# Patient Record
Sex: FEMALE | Race: WHITE
[De-identification: names, ages, dates, MRNs, and addresses within clinical notes are randomized per-mention and may not be internally consistent; named-entity substitution may affect disease eponyms.]

---

## 2017-04-02 ENCOUNTER — HOSPITAL ENCOUNTER (EMERGENCY)
Dept: HOSPITAL 8 - ED | Age: 42
Discharge: HOME | End: 2017-04-02
Payer: MEDICAID

## 2017-04-02 VITALS — HEIGHT: 68 IN | BODY MASS INDEX: 17.81 KG/M2 | WEIGHT: 117.51 LBS

## 2017-04-02 VITALS — SYSTOLIC BLOOD PRESSURE: 124 MMHG | DIASTOLIC BLOOD PRESSURE: 67 MMHG

## 2017-04-02 DIAGNOSIS — N83.00: ICD-10-CM

## 2017-04-02 DIAGNOSIS — D25.9: Primary | ICD-10-CM

## 2017-04-02 DIAGNOSIS — Z88.8: ICD-10-CM

## 2017-04-02 LAB
ANISOCYTOSIS BLD QL SMEAR: (no result)
BUN SERPL-MCNC: 8 MG/DL (ref 7–18)
GIANT PLATELETS BLD QL SMEAR: (no result)
HGB BLD-MCNC: 10.5 G/DL (ref 11.7–16.4)
HYPOCHROMIA BLD QL SMEAR: (no result)
LG PLATELETS BLD QL SMEAR: (no result)
MICROCYTES BLD QL SMEAR: (no result)
OVALOCYTES BLD QL SMEAR: (no result)

## 2017-04-02 PROCEDURE — 81003 URINALYSIS AUTO W/O SCOPE: CPT

## 2017-04-02 PROCEDURE — 96375 TX/PRO/DX INJ NEW DRUG ADDON: CPT

## 2017-04-02 PROCEDURE — 96374 THER/PROPH/DIAG INJ IV PUSH: CPT

## 2017-04-02 PROCEDURE — 76830 TRANSVAGINAL US NON-OB: CPT

## 2017-04-02 PROCEDURE — 84702 CHORIONIC GONADOTROPIN TEST: CPT

## 2017-04-02 PROCEDURE — 36415 COLL VENOUS BLD VENIPUNCTURE: CPT

## 2017-04-02 PROCEDURE — 99285 EMERGENCY DEPT VISIT HI MDM: CPT

## 2017-04-02 PROCEDURE — 96361 HYDRATE IV INFUSION ADD-ON: CPT

## 2017-04-02 PROCEDURE — 82040 ASSAY OF SERUM ALBUMIN: CPT

## 2017-04-02 PROCEDURE — 80048 BASIC METABOLIC PNL TOTAL CA: CPT

## 2017-04-02 PROCEDURE — 85025 COMPLETE CBC W/AUTO DIFF WBC: CPT

## 2018-04-10 ENCOUNTER — HOSPITAL ENCOUNTER (EMERGENCY)
Dept: HOSPITAL 8 - ED | Age: 43
Discharge: LEFT BEFORE DISCHARGE | End: 2018-04-10
Payer: SELF-PAY

## 2018-04-10 VITALS — DIASTOLIC BLOOD PRESSURE: 71 MMHG | SYSTOLIC BLOOD PRESSURE: 111 MMHG

## 2018-04-10 VITALS — BODY MASS INDEX: 20.38 KG/M2 | WEIGHT: 134.48 LBS | HEIGHT: 68 IN

## 2018-04-10 DIAGNOSIS — R11.10: ICD-10-CM

## 2018-04-10 DIAGNOSIS — R51: Primary | ICD-10-CM

## 2018-04-10 DIAGNOSIS — G43.909: ICD-10-CM

## 2018-04-10 LAB
<PLATELET ESTIMATE>: ADEQUATE
ALBUMIN SERPL-MCNC: 3.6 G/DL (ref 3.4–5)
ANION GAP SERPL CALC-SCNC: 8 MMOL/L (ref 5–15)
ANISOCYTOSIS BLD QL SMEAR: (no result)
BASOPHILS # BLD AUTO: 0.1 X10^3/UL (ref 0–0.1)
BASOPHILS NFR BLD AUTO: 1 % (ref 0–1)
CALCIUM SERPL-MCNC: 8.1 MG/DL (ref 8.5–10.1)
CHLORIDE SERPL-SCNC: 109 MMOL/L (ref 98–107)
CREAT SERPL-MCNC: 0.74 MG/DL (ref 0.55–1.02)
EOSINOPHIL # BLD AUTO: 0.06 X10^3/UL (ref 0–0.4)
EOSINOPHIL NFR BLD AUTO: 1 % (ref 1–7)
ERYTHROCYTE [DISTWIDTH] IN BLOOD BY AUTOMATED COUNT: 18.9 % (ref 9.6–15.2)
GIANT PLATELETS BLD QL SMEAR: (no result)
HYPOCHROMIA BLD QL SMEAR: (no result)
LG PLATELETS BLD QL SMEAR: (no result)
LYMPHOCYTES # BLD AUTO: 1.58 X10^3/UL (ref 1–3.4)
LYMPHOCYTES NFR BLD AUTO: 18 % (ref 22–44)
MCH RBC QN AUTO: 21.8 PG (ref 27–34.8)
MCHC RBC AUTO-ENTMCNC: 31.2 G/DL (ref 32.4–35.8)
MCV RBC AUTO: 70 FL (ref 80–100)
MD: (no result)
MICROCYTES BLD QL SMEAR: (no result)
MONOCYTES # BLD AUTO: 0.63 X10^3/UL (ref 0.2–0.8)
MONOCYTES NFR BLD AUTO: 7 % (ref 2–9)
NEUTROPHILS # BLD AUTO: 6.58 X10^3/UL (ref 1.8–6.8)
NEUTROPHILS NFR BLD AUTO: 74 % (ref 42–75)
OVALOCYTES BLD QL SMEAR: (no result)
PLATELET # BLD AUTO: 275 X10^3/UL (ref 130–400)
PMV BLD AUTO: 11.3 FL (ref 7.4–10.4)
RBC # BLD AUTO: 4.69 X10^6/UL (ref 3.82–5.3)

## 2018-04-10 PROCEDURE — 80048 BASIC METABOLIC PNL TOTAL CA: CPT

## 2018-04-10 PROCEDURE — 82040 ASSAY OF SERUM ALBUMIN: CPT

## 2018-04-10 PROCEDURE — 85025 COMPLETE CBC W/AUTO DIFF WBC: CPT

## 2018-04-10 PROCEDURE — 36415 COLL VENOUS BLD VENIPUNCTURE: CPT

## 2018-04-10 PROCEDURE — 99284 EMERGENCY DEPT VISIT MOD MDM: CPT

## 2018-04-10 PROCEDURE — 84703 CHORIONIC GONADOTROPIN ASSAY: CPT

## 2019-03-10 ENCOUNTER — HOSPITAL ENCOUNTER (EMERGENCY)
Dept: HOSPITAL 8 - ED | Age: 44
Discharge: HOME | End: 2019-03-10
Payer: MEDICAID

## 2019-03-10 VITALS — HEIGHT: 68 IN | BODY MASS INDEX: 17.84 KG/M2 | WEIGHT: 117.73 LBS

## 2019-03-10 VITALS — SYSTOLIC BLOOD PRESSURE: 99 MMHG | DIASTOLIC BLOOD PRESSURE: 51 MMHG

## 2019-03-10 DIAGNOSIS — J20.9: ICD-10-CM

## 2019-03-10 DIAGNOSIS — N89.8: Primary | ICD-10-CM

## 2019-03-10 DIAGNOSIS — B96.89: ICD-10-CM

## 2019-03-10 LAB
CLUE CELLS: (no result)
CULTURE INDICATED?: NO
HCG UR SG: 1.02 (ref 1–1.03)
MICROSCOPIC: (no result)
T VAGINALIS RRNA GENITAL QL PROBE: (no result)
WET PREP WBCS: (no result)

## 2019-03-10 PROCEDURE — 87591 N.GONORRHOEAE DNA AMP PROB: CPT

## 2019-03-10 PROCEDURE — 81025 URINE PREGNANCY TEST: CPT

## 2019-03-10 PROCEDURE — 99283 EMERGENCY DEPT VISIT LOW MDM: CPT

## 2019-03-10 PROCEDURE — 87210 SMEAR WET MOUNT SALINE/INK: CPT

## 2019-03-10 PROCEDURE — 87808 TRICHOMONAS ASSAY W/OPTIC: CPT

## 2019-03-10 PROCEDURE — 81003 URINALYSIS AUTO W/O SCOPE: CPT

## 2019-03-10 PROCEDURE — 87491 CHLMYD TRACH DNA AMP PROBE: CPT

## 2019-03-10 PROCEDURE — 96372 THER/PROPH/DIAG INJ SC/IM: CPT

## 2019-03-10 NOTE — NUR
MEDS GIVEN PER ERP ORDER.  URINE COLLECTED/SENT TO LAB.  PT UPDATED ON POC.  
CALL LIGHT WITHIN REACH, WARM BLANKET PROVIDED.

## 2019-07-12 ENCOUNTER — HOSPITAL ENCOUNTER (EMERGENCY)
Dept: HOSPITAL 8 - ED | Age: 44
Discharge: HOME | End: 2019-07-12
Payer: MEDICAID

## 2019-07-12 VITALS — HEIGHT: 68 IN | WEIGHT: 114.64 LBS | BODY MASS INDEX: 17.37 KG/M2

## 2019-07-12 VITALS — DIASTOLIC BLOOD PRESSURE: 63 MMHG | SYSTOLIC BLOOD PRESSURE: 107 MMHG

## 2019-07-12 DIAGNOSIS — F41.1: Primary | ICD-10-CM

## 2019-07-12 DIAGNOSIS — R55: ICD-10-CM

## 2019-07-12 LAB
ALBUMIN SERPL-MCNC: 3.3 G/DL (ref 3.4–5)
ALP SERPL-CCNC: 49 U/L (ref 45–117)
ALT SERPL-CCNC: 20 U/L (ref 12–78)
ANION GAP SERPL CALC-SCNC: 5 MMOL/L (ref 5–15)
BASOPHILS # BLD AUTO: 0.07 X10^3/UL (ref 0–0.1)
BASOPHILS NFR BLD AUTO: 1 % (ref 0–1)
BILIRUB SERPL-MCNC: 0.4 MG/DL (ref 0.2–1)
CALCIUM SERPL-MCNC: 8.3 MG/DL (ref 8.5–10.1)
CHLORIDE SERPL-SCNC: 109 MMOL/L (ref 98–107)
CREAT SERPL-MCNC: 0.7 MG/DL (ref 0.55–1.02)
EOSINOPHIL # BLD AUTO: 0.02 X10^3/UL (ref 0–0.4)
EOSINOPHIL NFR BLD AUTO: 0 % (ref 1–7)
ERYTHROCYTE [DISTWIDTH] IN BLOOD BY AUTOMATED COUNT: 19.8 % (ref 9.6–15.2)
LYMPHOCYTES # BLD AUTO: 1.44 X10^3/UL (ref 1–3.4)
LYMPHOCYTES NFR BLD AUTO: 28 % (ref 22–44)
MCH RBC QN AUTO: 21.2 PG (ref 27–34.8)
MCHC RBC AUTO-ENTMCNC: 30.9 G/DL (ref 32.4–35.8)
MCV RBC AUTO: 68.7 FL (ref 80–100)
MD: (no result)
MONOCYTES # BLD AUTO: 0.38 X10^3/UL (ref 0.2–0.8)
MONOCYTES NFR BLD AUTO: 7 % (ref 2–9)
NEUTROPHILS # BLD AUTO: 3.23 X10^3/UL (ref 1.8–6.8)
NEUTROPHILS NFR BLD AUTO: 63 % (ref 42–75)
PLATELET # BLD AUTO: 240 X10^3/UL (ref 130–400)
PMV BLD AUTO: 12.2 FL (ref 7.4–10.4)
PROT SERPL-MCNC: 6.7 G/DL (ref 6.4–8.2)
RBC # BLD AUTO: 4.46 X10^6/UL (ref 3.82–5.3)
T4 (THYROXINE): 7.1 MCG/DL (ref 4.8–13.9)
TROPONIN I SERPL-MCNC: < 0.015 NG/ML (ref 0–0.04)
TSH SERPL-ACNC: 0.81 MIU/L (ref 0.36–3.74)

## 2019-07-12 PROCEDURE — 84484 ASSAY OF TROPONIN QUANT: CPT

## 2019-07-12 PROCEDURE — 85025 COMPLETE CBC W/AUTO DIFF WBC: CPT

## 2019-07-12 PROCEDURE — 93005 ELECTROCARDIOGRAM TRACING: CPT

## 2019-07-12 PROCEDURE — 84443 ASSAY THYROID STIM HORMONE: CPT

## 2019-07-12 PROCEDURE — 84436 ASSAY OF TOTAL THYROXINE: CPT

## 2019-07-12 PROCEDURE — 36415 COLL VENOUS BLD VENIPUNCTURE: CPT

## 2019-07-12 PROCEDURE — 99284 EMERGENCY DEPT VISIT MOD MDM: CPT

## 2019-07-12 PROCEDURE — 80053 COMPREHEN METABOLIC PANEL: CPT

## 2019-07-12 NOTE — NUR
FIRST CONTACT WITH PT:  Patient/Caregiver given discharge instructions and they 
have confirmed that they understand the instructions.  Patient ambulatory with 
steady gait. PT LEFT WITH ALL PERSONAL BELONGINGS. PIV D/C WITH TIP INTACT. 
PRESSURE DRESSING APPLIED.

## 2019-07-12 NOTE — NUR
PATIENT REPORTS CHEST PAIN IMPROVED. 

VITALS STABLE ON MONITOR

WITH FURTHER ASSESSMENT-PATIENT REPORTS SHE BELIVES SHE IS HAVING CHEST PAIN AS 
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 
WELL AS TEENAGER)-SW AT BEDSIDE TO PROVIDE PATIENT RESOURCES TO OBTAIN MORE 
HELP (SNF) FOR PARENT